# Patient Record
Sex: FEMALE | Race: WHITE | Employment: FULL TIME | URBAN - METROPOLITAN AREA
[De-identification: names, ages, dates, MRNs, and addresses within clinical notes are randomized per-mention and may not be internally consistent; named-entity substitution may affect disease eponyms.]

---

## 2023-10-05 ENCOUNTER — OFFICE VISIT (OUTPATIENT)
Dept: FAMILY MEDICINE CLINIC | Age: 38
End: 2023-10-05
Payer: COMMERCIAL

## 2023-10-05 VITALS
HEIGHT: 64 IN | HEART RATE: 92 BPM | SYSTOLIC BLOOD PRESSURE: 116 MMHG | OXYGEN SATURATION: 99 % | BODY MASS INDEX: 25.71 KG/M2 | TEMPERATURE: 98.9 F | WEIGHT: 150.6 LBS | DIASTOLIC BLOOD PRESSURE: 70 MMHG

## 2023-10-05 DIAGNOSIS — R10.32 ABDOMINAL PAIN, CHRONIC, LEFT LOWER QUADRANT: ICD-10-CM

## 2023-10-05 DIAGNOSIS — Z00.00 ENCOUNTER FOR ROUTINE ADULT HEALTH EXAMINATION WITHOUT ABNORMAL FINDINGS: Primary | ICD-10-CM

## 2023-10-05 DIAGNOSIS — G89.29 ABDOMINAL PAIN, CHRONIC, LEFT LOWER QUADRANT: ICD-10-CM

## 2023-10-05 DIAGNOSIS — R33.9 URINARY RETENTION: ICD-10-CM

## 2023-10-05 PROCEDURE — 99395 PREV VISIT EST AGE 18-39: CPT | Performed by: FAMILY MEDICINE

## 2023-10-05 RX ORDER — HYOSCYAMINE SULFATE 0.125 MG
125 TABLET ORAL EVERY 4 HOURS PRN
COMMUNITY

## 2023-10-05 RX ORDER — LANOLIN ALCOHOL/MO/W.PET/CERES
3 CREAM (GRAM) TOPICAL DAILY
COMMUNITY

## 2023-10-05 RX ORDER — ALBUTEROL SULFATE 90 UG/1
2 AEROSOL, METERED RESPIRATORY (INHALATION) EVERY 6 HOURS PRN
COMMUNITY

## 2023-10-05 RX ORDER — MULTIVIT WITH MINERALS/LUTEIN
250 TABLET ORAL DAILY
COMMUNITY

## 2023-10-05 RX ORDER — ALBUTEROL SULFATE 0.63 MG/3ML
1 SOLUTION RESPIRATORY (INHALATION) EVERY 6 HOURS PRN
COMMUNITY

## 2023-10-05 RX ORDER — MAGNESIUM 30 MG
TABLET ORAL 2 TIMES DAILY
COMMUNITY

## 2023-10-05 SDOH — ECONOMIC STABILITY: HOUSING INSECURITY
IN THE LAST 12 MONTHS, WAS THERE A TIME WHEN YOU DID NOT HAVE A STEADY PLACE TO SLEEP OR SLEPT IN A SHELTER (INCLUDING NOW)?: NO

## 2023-10-05 SDOH — ECONOMIC STABILITY: INCOME INSECURITY: HOW HARD IS IT FOR YOU TO PAY FOR THE VERY BASICS LIKE FOOD, HOUSING, MEDICAL CARE, AND HEATING?: NOT HARD AT ALL

## 2023-10-05 SDOH — ECONOMIC STABILITY: FOOD INSECURITY: WITHIN THE PAST 12 MONTHS, YOU WORRIED THAT YOUR FOOD WOULD RUN OUT BEFORE YOU GOT MONEY TO BUY MORE.: NEVER TRUE

## 2023-10-05 SDOH — ECONOMIC STABILITY: FOOD INSECURITY: WITHIN THE PAST 12 MONTHS, THE FOOD YOU BOUGHT JUST DIDN'T LAST AND YOU DIDN'T HAVE MONEY TO GET MORE.: NEVER TRUE

## 2023-10-05 ASSESSMENT — PATIENT HEALTH QUESTIONNAIRE - PHQ9
SUM OF ALL RESPONSES TO PHQ QUESTIONS 1-9: 0
SUM OF ALL RESPONSES TO PHQ9 QUESTIONS 1 & 2: 0
SUM OF ALL RESPONSES TO PHQ QUESTIONS 1-9: 0
2. FEELING DOWN, DEPRESSED OR HOPELESS: 0
1. LITTLE INTEREST OR PLEASURE IN DOING THINGS: 0

## 2023-10-05 ASSESSMENT — ENCOUNTER SYMPTOMS: ABDOMINAL PAIN: 1

## 2023-10-17 DIAGNOSIS — R79.89 ABNORMAL TSH: Primary | ICD-10-CM

## 2023-11-10 ENCOUNTER — TELEPHONE (OUTPATIENT)
Dept: FAMILY MEDICINE CLINIC | Age: 38
End: 2023-11-10

## 2023-11-10 NOTE — TELEPHONE ENCOUNTER
Called and spoke to patient, she verbalized a clear understanding. States she has had chronic constipation since she was little. She recently had a colonoscopy and reports she was advised she has a tortuous bowel & internal hemorrhoids, but otherwise normal results. She is still having pain and is wondering if there is anything else she can be doing/taking to help manage the pain.

## 2023-11-10 NOTE — TELEPHONE ENCOUNTER
Patient called inquiring about her lab results and CT scan she had completed on 10/28 at [facility].

## 2024-03-08 ENCOUNTER — TELEPHONE (OUTPATIENT)
Dept: FAMILY MEDICINE CLINIC | Age: 39
End: 2024-03-08

## 2024-03-08 NOTE — TELEPHONE ENCOUNTER
Tequila from [facility] calling going to fax papers of Medical Necessity for patient    Just a heads up to be on look out, thank you

## 2024-04-15 ENCOUNTER — TELEPHONE (OUTPATIENT)
Dept: FAMILY MEDICINE CLINIC | Age: 39
End: 2024-04-15

## 2024-04-15 NOTE — TELEPHONE ENCOUNTER
Pt is requesting a letter stating she is healthy to work for a Company called [employer]. They are needing a doctor's signature. Pt was not sure if she needs to come in for an appt to get this taken care of or if it can be done without an appt.

## 2024-08-20 ENCOUNTER — OFFICE VISIT (OUTPATIENT)
Dept: FAMILY MEDICINE CLINIC | Age: 39
End: 2024-08-20
Payer: COMMERCIAL

## 2024-08-20 VITALS
DIASTOLIC BLOOD PRESSURE: 78 MMHG | HEART RATE: 108 BPM | BODY MASS INDEX: 26.56 KG/M2 | HEIGHT: 64 IN | SYSTOLIC BLOOD PRESSURE: 132 MMHG | TEMPERATURE: 97.1 F | OXYGEN SATURATION: 99 % | WEIGHT: 155.6 LBS

## 2024-08-20 DIAGNOSIS — R53.83 OTHER FATIGUE: ICD-10-CM

## 2024-08-20 DIAGNOSIS — R53.1 GENERALIZED WEAKNESS: ICD-10-CM

## 2024-08-20 DIAGNOSIS — R07.9 CHEST PAIN, UNSPECIFIED TYPE: Primary | ICD-10-CM

## 2024-08-20 DIAGNOSIS — R07.9 CHEST PAIN, UNSPECIFIED TYPE: ICD-10-CM

## 2024-08-20 DIAGNOSIS — E61.1 IRON DEFICIENCY: Primary | ICD-10-CM

## 2024-08-20 PROCEDURE — 93000 ELECTROCARDIOGRAM COMPLETE: CPT | Performed by: FAMILY MEDICINE

## 2024-08-20 PROCEDURE — 99213 OFFICE O/P EST LOW 20 MIN: CPT | Performed by: FAMILY MEDICINE

## 2024-08-20 ASSESSMENT — PATIENT HEALTH QUESTIONNAIRE - PHQ9
2. FEELING DOWN, DEPRESSED OR HOPELESS: NOT AT ALL
SUM OF ALL RESPONSES TO PHQ QUESTIONS 1-9: 0
SUM OF ALL RESPONSES TO PHQ QUESTIONS 1-9: 0
SUM OF ALL RESPONSES TO PHQ9 QUESTIONS 1 & 2: 0
1. LITTLE INTEREST OR PLEASURE IN DOING THINGS: NOT AT ALL
SUM OF ALL RESPONSES TO PHQ QUESTIONS 1-9: 0
SUM OF ALL RESPONSES TO PHQ QUESTIONS 1-9: 0
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
## 2024-08-23 LAB
EBV EA-D IGG SER-ACNC: <5 U/ML (ref 0–10.9)
EBV NA IGG SER IA-ACNC: 116 U/ML (ref 0–21.9)
EBV VCA IGG SER IA-ACNC: 367 U/ML (ref 0–21.9)
EBV VCA IGM SER IA-ACNC: <10 U/ML (ref 0–43.9)

## 2024-08-24 LAB
REASON FOR REJECTION: NORMAL
REJECTED TEST: NORMAL

## 2024-11-05 ENCOUNTER — OFFICE VISIT (OUTPATIENT)
Dept: FAMILY MEDICINE CLINIC | Age: 39
End: 2024-11-05
Payer: COMMERCIAL

## 2024-11-05 VITALS
WEIGHT: 156.8 LBS | HEART RATE: 124 BPM | SYSTOLIC BLOOD PRESSURE: 125 MMHG | DIASTOLIC BLOOD PRESSURE: 75 MMHG | OXYGEN SATURATION: 98 % | HEIGHT: 64 IN | BODY MASS INDEX: 26.77 KG/M2 | TEMPERATURE: 97.3 F

## 2024-11-05 DIAGNOSIS — A04.8 H. PYLORI INFECTION: ICD-10-CM

## 2024-11-05 DIAGNOSIS — E55.9 VITAMIN D DEFICIENCY: ICD-10-CM

## 2024-11-05 DIAGNOSIS — F51.04 CHRONIC INSOMNIA: ICD-10-CM

## 2024-11-05 DIAGNOSIS — R53.83 OTHER FATIGUE: ICD-10-CM

## 2024-11-05 DIAGNOSIS — Z00.00 ENCOUNTER FOR WELL ADULT EXAM WITHOUT ABNORMAL FINDINGS: Primary | ICD-10-CM

## 2024-11-05 DIAGNOSIS — E53.8 VITAMIN B12 DEFICIENCY: ICD-10-CM

## 2024-11-05 DIAGNOSIS — R53.1 GENERALIZED WEAKNESS: ICD-10-CM

## 2024-11-05 PROCEDURE — 99396 PREV VISIT EST AGE 40-64: CPT | Performed by: FAMILY MEDICINE

## 2024-11-05 RX ORDER — CLONIDINE HYDROCHLORIDE 0.1 MG/1
0.1 TABLET ORAL
Qty: 10 TABLET | Refills: 1 | Status: SHIPPED | OUTPATIENT
Start: 2024-11-05

## 2024-11-05 RX ORDER — ALBUTEROL SULFATE 90 UG/1
2 INHALANT RESPIRATORY (INHALATION) EVERY 6 HOURS PRN
Qty: 3 EACH | Refills: 3 | Status: SHIPPED | OUTPATIENT
Start: 2024-11-05

## 2024-11-05 SDOH — ECONOMIC STABILITY: FOOD INSECURITY: WITHIN THE PAST 12 MONTHS, THE FOOD YOU BOUGHT JUST DIDN'T LAST AND YOU DIDN'T HAVE MONEY TO GET MORE.: NEVER TRUE

## 2024-11-05 SDOH — ECONOMIC STABILITY: FOOD INSECURITY: WITHIN THE PAST 12 MONTHS, YOU WORRIED THAT YOUR FOOD WOULD RUN OUT BEFORE YOU GOT MONEY TO BUY MORE.: NEVER TRUE

## 2024-11-05 SDOH — ECONOMIC STABILITY: INCOME INSECURITY: HOW HARD IS IT FOR YOU TO PAY FOR THE VERY BASICS LIKE FOOD, HOUSING, MEDICAL CARE, AND HEATING?: NOT HARD AT ALL

## 2024-11-05 NOTE — PROGRESS NOTES
Well Adult Note  Name: Tessa Flores Today’s Date: 2024   MRN: [id number] Sex: Female   Age: 39 y.o. Ethnicity: Non- / Non    : 1985 Race: White (non-)      Tessa Flores is here for a well adult exam.       Subjective   History:  She has recovered from Virus but still having weakness in the legs.  She did not get the blood work done.  She has continued to be fatigued.  Easy bruising.  She is concerned about vitamin Deficiencies.  Having issues sleeping.  Trouble going back to sleep when she has been woken up.  She has also been having some burning in her abdomen.  H/o PUD with H.Pylori.  She has also been getting rashy.  Has a patch on her right leg.    She is s/p hysterectomy and only has one ovary    Review of Systems   All other systems reviewed and are negative.      Allergies   Allergen Reactions    Bactrim [Sulfamethoxazole-Trimethoprim] Swelling     Prior to Visit Medications    Medication Sig Taking? Authorizing Provider   albuterol sulfate HFA (VENTOLIN HFA) 108 (90 Base) MCG/ACT inhaler Inhale 2 puffs into the lungs every 6 hours as needed for Wheezing Yes Douglas Thomas DO   cloNIDine (CATAPRES) 0.1 MG tablet Take 1 tablet by mouth nightly as needed for Other (sleep) Yes Douglas Thomas DO   linaCLOtide (LINZESS) 72 MCG CAPS capsule Take 1 capsule by mouth every morning (before breakfast)  Douglas Thomas, DO   hyoscyamine (ANASPAZ;LEVSIN) 125 MCG tablet Take 1 tablet by mouth every 4 hours as needed for Cramping  Luis Paiz MD   Ascorbic Acid (VITAMIN C) 250 MG tablet Take 1 tablet by mouth daily  Luis Paiz MD   magnesium 30 MG tablet Take by mouth 2 times daily  Luis Paiz MD   melatonin 3 MG TABS tablet Take 1 tablet by mouth daily  Luis Paiz MD   albuterol (ACCUNEB) 0.63 MG/3ML nebulizer solution Take 3 mLs by nebulization every 6 hours as needed for Wheezing  Luis Paiz MD     History reviewed. No

## 2024-11-29 ENCOUNTER — TELEPHONE (OUTPATIENT)
Dept: FAMILY MEDICINE CLINIC | Age: 39
End: 2024-11-29

## 2024-11-29 DIAGNOSIS — R76.8 POSITIVE ANA (ANTINUCLEAR ANTIBODY): Primary | ICD-10-CM

## 2024-11-29 NOTE — TELEPHONE ENCOUNTER
Patient inquiring on lab results from 11/7/24. No Provider result notes so I was unable to release results. Please call Patient at [phone]. Informed Patient provider out of office until 12/2/24.

## 2025-01-22 ENCOUNTER — OFFICE VISIT (OUTPATIENT)
Age: 40
End: 2025-01-22
Payer: COMMERCIAL

## 2025-01-22 DIAGNOSIS — R76.8 POSITIVE ANA (ANTINUCLEAR ANTIBODY): ICD-10-CM

## 2025-01-22 DIAGNOSIS — R21 RASH: ICD-10-CM

## 2025-01-22 DIAGNOSIS — R76.8 POSITIVE ANA (ANTINUCLEAR ANTIBODY): Primary | ICD-10-CM

## 2025-01-22 DIAGNOSIS — R53.83 OTHER FATIGUE: ICD-10-CM

## 2025-01-22 LAB
BILIRUB UR QL STRIP: NEGATIVE
CLARITY UR: CLEAR
COLOR UR: YELLOW
CRP SERPL HS-MCNC: <3 MG/L (ref 0–5)
GLUCOSE UR STRIP-MCNC: NEGATIVE MG/DL
HGB UR QL STRIP: NEGATIVE
KETONES UR STRIP-MCNC: NEGATIVE MG/DL
LEUKOCYTE ESTERASE UR QL STRIP: NEGATIVE
NITRITE UR QL STRIP: NEGATIVE
PH UR STRIP: 7.5 [PH] (ref 5–9)
PROT UR STRIP-MCNC: NEGATIVE MG/DL
SP GR UR STRIP: 1.01 (ref 1–1.03)
UROBILINOGEN UR STRIP-ACNC: 0.2 E.U./DL

## 2025-01-22 PROCEDURE — 99204 OFFICE O/P NEW MOD 45 MIN: CPT | Performed by: INTERNAL MEDICINE

## 2025-01-22 PROCEDURE — 99203 OFFICE O/P NEW LOW 30 MIN: CPT | Performed by: INTERNAL MEDICINE

## 2025-01-22 RX ORDER — ERGOCALCIFEROL 1.25 MG/1
5000 CAPSULE ORAL DAILY
COMMUNITY

## 2025-01-22 ASSESSMENT — ENCOUNTER SYMPTOMS
BACK PAIN: 0
SHORTNESS OF BREATH: 0
ABDOMINAL PAIN: 0

## 2025-01-22 NOTE — PROGRESS NOTES
Reason for Referral:       Douglas Thomas DO  [address] [place] [address]    Chief Complaint   Patient presents with    [facility]     Patients here today for positive BRANDI,            HISTORY OF PRESENT ILLNESS: Ms.Kristen Flores is a 39 y.o. female referred for evaluation of a positive BRANDI.  Patient was seen today at the courtesy of Dr. Thomas.    This is a very pleasant 39-year-old female seen at the courtesy of Dr. Thomas for evaluation of a positive BRANDI.  The patient states that in August 2024 she had 2 to 3 weeks of profound fatigue.  She had difficulty getting her daily life.  There was no obvious source for her fatigue although she was told that she could have possibly had a viral illness.  Subsequent workup has revealed a positive BRANDI 1: 160.  Patient also had a borderline vitamin D level of 30 and has since started vitamin D3 5000 international units daily.  She feels like this has helped.  She has been on this for about 2 months.    On further questioning she has had a recurrent rash on her right leg, posteriorly near her popliteal fossa.  She showed pictures today and it seems to resemble urticaria.  She does not notice any particular trigger for this.  Denies any facial rashes or photosensitivity.  No recurrent oral or nasal ulcers significant sicca or xerostomia or any history of serositis.  Denies any known renal, neurologic or hematologic issues.  She has right shoulder pain which she attributes to using a manual transmission and right hip pain when she drives for too long.  Does not really describe any swelling redness or warmth to any of her joints and rarely has to take medications.  No constitutional symptoms other than the fatigue.     Past Medical,Family, and Social History reviewed.  Patient's PMH/PSH,SH,PSYCH Hx, MEDs, ALLERGIES, and ROS were all reviewed and updated in the appropriate sections.    PAST MEDICAL HISTORY:  No past medical history on file.    Past Surgical

## 2025-01-22 NOTE — PATIENT INSTRUCTIONS
Labs and urinalysis today and I will contact you with these results    Currently I do not see evidence of a connective tissue disease such as lupus.  I am going to check some additional antibodies, complement levels and a urinalysis to make sure you do not have any protein or blood cells.  I should have these results back by early next week.    If all of your tests are normal I would like to see you again at least 1 time in about 6 months.  However if you start developing rashes on your face or in sun exposed areas or start to have swollen red or hot joints, please let me know and I would like to see you sooner.  If the rash on your leg continues to come and go, I may recommend to Dr. Thomas that he consider an allergy referral to evaluate.

## 2025-01-23 LAB
C3 SERPL-MCNC: 115 MG/DL (ref 90–180)
C4 SERPL-MCNC: 18 MG/DL (ref 10–40)
RHEUMATOID FACTOR: <10 IU/ML (ref 0–13)

## 2025-01-24 LAB — DSDNA AB TITR SER CLIF: 4 IU (ref 0–24)

## 2025-01-25 LAB
ENA RNP IGG SER IA-ACNC: 4 UNITS (ref 0–19)
ENA SM IGG SER-ACNC: 5 AU/ML (ref 0–40)
ENA SS-A 60KD AB SER-ACNC: 1 AU/ML (ref 0–40)
ENA SS-A IGG SER IA-ACNC: 2 AU/ML (ref 0–40)
ENA SS-B IGG SER IA-ACNC: 1 AU/ML (ref 0–40)

## 2025-01-28 ENCOUNTER — PATIENT MESSAGE (OUTPATIENT)
Age: 40
End: 2025-01-28

## 2025-02-13 ENCOUNTER — PATIENT MESSAGE (OUTPATIENT)
Age: 40
End: 2025-02-13

## 2025-02-13 DIAGNOSIS — F51.04 CHRONIC INSOMNIA: Primary | ICD-10-CM

## 2025-02-13 DIAGNOSIS — E34.9 HORMONE DEFICIENCY: ICD-10-CM

## 2025-02-14 RX ORDER — PROGESTERONE 100 MG/1
100 CAPSULE ORAL NIGHTLY
Qty: 30 CAPSULE | Refills: 2 | Status: SHIPPED | OUTPATIENT
Start: 2025-02-14

## 2025-02-14 RX ORDER — TRAZODONE HYDROCHLORIDE 50 MG/1
50 TABLET, FILM COATED ORAL NIGHTLY PRN
Qty: 30 TABLET | Refills: 5 | Status: SHIPPED | OUTPATIENT
Start: 2025-02-14

## 2025-02-14 RX ORDER — CRANBERRY FRUIT EXTRACT 650 MG
25 CAPSULE ORAL DAILY
Qty: 30 CAPSULE | Refills: 2 | Status: SHIPPED | OUTPATIENT
Start: 2025-02-14

## 2025-05-13 ENCOUNTER — OFFICE VISIT (OUTPATIENT)
Age: 40
End: 2025-05-13
Payer: COMMERCIAL

## 2025-05-13 VITALS
TEMPERATURE: 98.1 F | SYSTOLIC BLOOD PRESSURE: 130 MMHG | WEIGHT: 155 LBS | OXYGEN SATURATION: 99 % | HEART RATE: 80 BPM | DIASTOLIC BLOOD PRESSURE: 76 MMHG | BODY MASS INDEX: 26.46 KG/M2 | HEIGHT: 64 IN

## 2025-05-13 DIAGNOSIS — J45.21 MILD INTERMITTENT ASTHMA WITH EXACERBATION: Primary | ICD-10-CM

## 2025-05-13 PROCEDURE — 99213 OFFICE O/P EST LOW 20 MIN: CPT | Performed by: NURSE PRACTITIONER

## 2025-05-13 RX ORDER — PREDNISONE 20 MG/1
20 TABLET ORAL 2 TIMES DAILY
Qty: 10 TABLET | Refills: 0 | Status: SHIPPED | OUTPATIENT
Start: 2025-05-13 | End: 2025-05-18

## 2025-05-13 SDOH — ECONOMIC STABILITY: FOOD INSECURITY: WITHIN THE PAST 12 MONTHS, THE FOOD YOU BOUGHT JUST DIDN'T LAST AND YOU DIDN'T HAVE MONEY TO GET MORE.: NEVER TRUE

## 2025-05-13 SDOH — ECONOMIC STABILITY: FOOD INSECURITY: WITHIN THE PAST 12 MONTHS, YOU WORRIED THAT YOUR FOOD WOULD RUN OUT BEFORE YOU GOT MONEY TO BUY MORE.: NEVER TRUE

## 2025-05-13 ASSESSMENT — ENCOUNTER SYMPTOMS
VOICE CHANGE: 0
ABDOMINAL PAIN: 0
NAUSEA: 0
PHOTOPHOBIA: 0
STRIDOR: 0
CHEST TIGHTNESS: 1
SINUS PRESSURE: 0
SORE THROAT: 0
ABDOMINAL DISTENTION: 0
SHORTNESS OF BREATH: 1
EYE ITCHING: 0
EYE REDNESS: 0
SINUS PAIN: 0
RHINORRHEA: 0
EYE PAIN: 0
APNEA: 0
WHEEZING: 1
CHOKING: 0
TROUBLE SWALLOWING: 0
EYE DISCHARGE: 0
COUGH: 1
VOMITING: 0

## 2025-05-13 ASSESSMENT — PATIENT HEALTH QUESTIONNAIRE - PHQ9
1. LITTLE INTEREST OR PLEASURE IN DOING THINGS: NOT AT ALL
SUM OF ALL RESPONSES TO PHQ QUESTIONS 1-9: 0
2. FEELING DOWN, DEPRESSED OR HOPELESS: NOT AT ALL
SUM OF ALL RESPONSES TO PHQ QUESTIONS 1-9: 0

## 2025-05-13 NOTE — PROGRESS NOTES
Subjective:      Patient ID: Tessa Flores is a 40 y.o. female who presents today for:  Chief Complaint   Patient presents with    Asthma     Pt states she had a cold 2 weeks ago. Not getting better. Has asthma.   Also would like to check for a mold toxicity. Pt states concern with being sick every other month.        History of Present Illness  The patient presents for evaluation of asthma.    She reports a compromised immune system, as evidenced by recurrent colds in 11/2024, 01/2025, and 03/2025. Her most recent cold started on 05/02/2025, which she has been managing without medical intervention. She experiences asthma symptoms exclusively during illness episodes. She believes a steroid treatment may be necessary at this time. She does not report any other upper respiratory symptoms, and all other cold symptoms have resolved except for the cough. She also does not report any nausea, vomiting, headache, fever, or chills. She typically experiences 1 to 2 colds per year. She has been exposed to mold due to home renovations and wonders if this could be contributing to her frequent illnesses. Her symptoms were more severe during the day yesterday, but she only woke up twice last night due to coughing. She describes this cold as having more mucus than usual. She does not believe she has pneumonia. She has found prednisone effective in the past and does not require a refill of her albuterol prescription. She has been using albuterol almost every hour or two yesterday, with some improvement today. She notes that her condition improves in environments with less humidity. She reports a shallow cough with minimal expectoration, describing the sensation as if the sputum is descending down her throat rather than being expelled.    She is not taking Linzess anymore as she does not have a current prescription for it and has not needed it.      Past Medical History:   Diagnosis Date    Asthma     Irritable bowel syndrome

## 2025-05-19 ENCOUNTER — TELEPHONE (OUTPATIENT)
Age: 40
End: 2025-05-19

## 2025-05-19 DIAGNOSIS — E34.9 HORMONE DEFICIENCY: ICD-10-CM

## 2025-05-19 NOTE — TELEPHONE ENCOUNTER
Patient called about the progesterone. She wants to know if she was suppose to take it for 3 months only. If she is to keep taking it, she will need a new script sent in.      Please advise.

## 2025-05-20 RX ORDER — PROGESTERONE 100 MG/1
100 CAPSULE ORAL NIGHTLY
Qty: 90 CAPSULE | Refills: 1 | Status: SHIPPED | OUTPATIENT
Start: 2025-05-20

## 2025-06-16 ENCOUNTER — OFFICE VISIT (OUTPATIENT)
Age: 40
End: 2025-06-16
Payer: COMMERCIAL

## 2025-06-16 VITALS
DIASTOLIC BLOOD PRESSURE: 72 MMHG | HEART RATE: 116 BPM | OXYGEN SATURATION: 99 % | SYSTOLIC BLOOD PRESSURE: 128 MMHG | BODY MASS INDEX: 27.24 KG/M2 | TEMPERATURE: 97.7 F | WEIGHT: 156.2 LBS

## 2025-06-16 DIAGNOSIS — R76.8 POSITIVE ANA (ANTINUCLEAR ANTIBODY): ICD-10-CM

## 2025-06-16 DIAGNOSIS — R10.32 ABDOMINAL PAIN, CHRONIC, LEFT LOWER QUADRANT: ICD-10-CM

## 2025-06-16 DIAGNOSIS — R29.898 WEAKNESS OF BOTH LOWER EXTREMITIES: ICD-10-CM

## 2025-06-16 DIAGNOSIS — G89.29 ABDOMINAL PAIN, CHRONIC, LEFT LOWER QUADRANT: ICD-10-CM

## 2025-06-16 DIAGNOSIS — J45.21 MILD INTERMITTENT ASTHMA WITH EXACERBATION: Primary | ICD-10-CM

## 2025-06-16 DIAGNOSIS — B02.9 HERPES ZOSTER WITHOUT COMPLICATION: ICD-10-CM

## 2025-06-16 DIAGNOSIS — J06.9 RECURRENT UPPER RESPIRATORY TRACT INFECTION: ICD-10-CM

## 2025-06-16 PROCEDURE — 99214 OFFICE O/P EST MOD 30 MIN: CPT | Performed by: FAMILY MEDICINE

## 2025-06-16 RX ORDER — FLUTICASONE PROPIONATE AND SALMETEROL 100; 50 UG/1; UG/1
1 POWDER RESPIRATORY (INHALATION) EVERY 12 HOURS
Qty: 1 EACH | Refills: 3 | Status: SHIPPED | OUTPATIENT
Start: 2025-06-16

## 2025-06-16 RX ORDER — VALACYCLOVIR HYDROCHLORIDE 1 G/1
1000 TABLET, FILM COATED ORAL 3 TIMES DAILY
Qty: 21 TABLET | Refills: 0 | Status: SHIPPED | OUTPATIENT
Start: 2025-06-16 | End: 2025-06-23

## 2025-06-16 NOTE — PROGRESS NOTES
than usual.  - Lung function diminished, no current wheezing.  - Advair prescribed to manage asthma; repeat Ig levels will be conducted to assess changes since the last immune workup 3 years ago.    3. Leg weakness.  - Reports random nausea and leg weakness.  - Nerve study ordered to investigate the cause of leg weakness and determine if it is related to neuropathy.  1. Mild intermittent asthma with exacerbation  -     fluticasone-salmeterol (ADVAIR) 100-50 MCG/ACT AEPB diskus inhaler; Inhale 1 puff into the lungs in the morning and 1 puff in the evening., Disp-1 each, R-3Normal  2. Positive BRANDI (antinuclear antibody)  3. Abdominal pain, chronic, left lower quadrant  4. Recurrent upper respiratory tract infection  -     IgG; Future  -     IgM; Future  -     IgE; Future  -     Strep Pneumoniae Antibody Serotypes; Future  -     Tryptase; Future  5. Herpes zoster without complication  -     valACYclovir (VALTREX) 1 g tablet; Take 1 tablet by mouth 3 times daily for 7 days, Disp-21 tablet, R-0Normal  6. Weakness of both lower extremities  -     EMG; Future  -     Nerve conduction test; Future       No follow-ups on file.    The patient (or guardian, if applicable) and other individuals in attendance with the patient were advised that Artificial Intelligence will be utilized during this visit to record, process the conversation to generate a clinical note and to support improvement of the AI technology. The patient (or guardian, if applicable) and other individuals in attendance at the appointment consented to the use of AI, including the recording.      An electronic signature was used to authenticate this note.    --SOCORRO TRIPLETT, DO

## 2025-06-27 ENCOUNTER — RESULTS FOLLOW-UP (OUTPATIENT)
Age: 40
End: 2025-06-27

## 2025-06-27 DIAGNOSIS — G89.29 ABDOMINAL PAIN, CHRONIC, LEFT LOWER QUADRANT: ICD-10-CM

## 2025-06-27 DIAGNOSIS — R76.8 POSITIVE ANA (ANTINUCLEAR ANTIBODY): Primary | ICD-10-CM

## 2025-06-27 DIAGNOSIS — J06.9 RECURRENT UPPER RESPIRATORY TRACT INFECTION: ICD-10-CM

## 2025-06-27 DIAGNOSIS — R10.32 ABDOMINAL PAIN, CHRONIC, LEFT LOWER QUADRANT: ICD-10-CM

## 2025-07-10 ENCOUNTER — PATIENT MESSAGE (OUTPATIENT)
Age: 40
End: 2025-07-10

## 2025-07-10 DIAGNOSIS — A68.9 RELAPSING FEVER: Primary | ICD-10-CM

## 2025-07-23 ENCOUNTER — TELEPHONE (OUTPATIENT)
Age: 40
End: 2025-07-23

## 2025-07-23 NOTE — TELEPHONE ENCOUNTER
[facility] wanted to make sure we had the correct fax number for the [facility] lab  Fax [phone]

## 2025-07-23 NOTE — TELEPHONE ENCOUNTER
Pt called office requesting active lab orders from 7/11/25 to be faxed to [facility] lab    At time of call, pt did not have fax #

## 2025-08-21 ENCOUNTER — PATIENT MESSAGE (OUTPATIENT)
Age: 40
End: 2025-08-21

## 2025-08-21 DIAGNOSIS — J06.9 RECURRENT UPPER RESPIRATORY TRACT INFECTION: ICD-10-CM

## 2025-08-21 DIAGNOSIS — A68.9 RELAPSING FEVER: ICD-10-CM

## 2025-08-21 DIAGNOSIS — R76.8 POSITIVE ANA (ANTINUCLEAR ANTIBODY): Primary | ICD-10-CM
